# Patient Record
Sex: FEMALE | Race: WHITE | NOT HISPANIC OR LATINO | Employment: UNEMPLOYED | URBAN - METROPOLITAN AREA
[De-identification: names, ages, dates, MRNs, and addresses within clinical notes are randomized per-mention and may not be internally consistent; named-entity substitution may affect disease eponyms.]

---
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
## 2022-02-11 VITALS
BODY MASS INDEX: 28.5 KG/M2 | HEART RATE: 62 BPM | OXYGEN SATURATION: 99 % | SYSTOLIC BLOOD PRESSURE: 126 MMHG | BODY MASS INDEX: 28.35 KG/M2 | WEIGHT: 181.6 LBS | TEMPERATURE: 99.3 F | DIASTOLIC BLOOD PRESSURE: 86 MMHG | HEIGHT: 67 IN | TEMPERATURE: 97.9 F | WEIGHT: 180.6 LBS | DIASTOLIC BLOOD PRESSURE: 72 MMHG | SYSTOLIC BLOOD PRESSURE: 112 MMHG | HEART RATE: 100 BPM | HEIGHT: 67 IN

## 2022-02-12 VITALS
BODY MASS INDEX: 28.63 KG/M2 | HEART RATE: 81 BPM | WEIGHT: 182.4 LBS | SYSTOLIC BLOOD PRESSURE: 130 MMHG | HEART RATE: 75 BPM | HEIGHT: 67 IN | DIASTOLIC BLOOD PRESSURE: 80 MMHG | DIASTOLIC BLOOD PRESSURE: 81 MMHG | HEIGHT: 67 IN | BODY MASS INDEX: 28.25 KG/M2 | TEMPERATURE: 97.8 F | WEIGHT: 180 LBS | TEMPERATURE: 97.7 F | SYSTOLIC BLOOD PRESSURE: 138 MMHG

## 2022-02-15 NOTE — PROGRESS NOTES
Patient:   EDELMIRA HARRISON            MRN: [id number]            FIN: [id number]               Age:   16 years     Sex:  Female     :  2000   Associated Diagnoses:   None   Author:   Michael Loaiza MD      Visit Information      Date of Service: 2017 09:17 am  Performing Location: [facility]  Encounter#: [id number]      Primary Care Provider (PCP):  Not recorded.      Referring Provider:  No referring provider recorded for selected visit.      Chief Complaint   3/25/2017 9:32 AM CDT    c/o sore throat, cough x 3 days; friends have had bronchitis        History of Present Illness   see chief complaint as noted above and confirmed with the patient             The patient presents with cough.  The cough is described as barking and productive.  The severity of the cough is mild.  The context of the cough: occurred in association with illness.  Associated symptoms consist of chills, fever, nasal congestion, sore throat, denies chest pain, denies rash and denies shortness of breath.        Review of Systems   Constitutional:  Negative except as documented in history of present illness.    Ear/Nose/Mouth/Throat:  Negative except as documented in history of present illness.    Respiratory:  Negative except as documented in history of present illness.    Gastrointestinal:  Negative except as documented in history of present illness.    Genitourinary:  Negative except as documented in history of present illness.    Integumentary:  Negative except as documented in history of present illness.       Health Status   Allergies:    Allergic Reactions (Selected)  Severity Not Documented  Amoxicillin (Hives)   Medications:  (Selected)   Prescriptions  Prescribed  albuterol 90 mcg/inh inhalation aerosol: 2 puff(s), INH, QID, # 51 g, 0 Refill(s), Type: Maintenance, Pharmacy: [id number], 2 puff(s) inh qid  Documented Medications  Documented  Brintellix (vortioxetine) 10 mg oral  tablet: 1 tab(s) ( 10 mg ), po, qpm, 0 Refill(s), Type: Maintenance  Chantix: po, bid, 0 Refill(s), Type: Maintenance  Mirena 52 mg intrauterine device: 1 EA ( 52 mg ), intrauteral, once, 0 Refill(s), Type: Maintenance  busPIRone 5 mg oral tablet: 1 tab(s) ( 5 mg ), po, bid, 0 Refill(s), Type: Maintenance   Problem list:    All Problems  Tobacco user / [id number] / Probable      Histories   Past Medical History:    No active or resolved past medical history items have been selected or recorded.   Family History:    No family history items have been selected or recorded.   Procedure history:    No active procedure history items have been selected or recorded.      Physical Examination   Vital Signs   3/25/2017 9:32 AM CDT Temperature Tympanic 99.3 DegF    Peripheral Pulse Rate 100 bpm    Pulse Site Radial artery    HR Method Electronic    Systolic Blood Pressure 126 mmHg    Diastolic Blood Pressure 86 mmHg    Mean Arterial Pressure 99 mmHg    BP Site Right arm    BP Method Manual    Oxygen Saturation 99 %      Measurements from flowsheet : Measurements   3/25/2017 9:32 AM CDT Height Measured - Standard 67 in    Weight Measured - Standard 180.6 lb    BSA 1.97 m2    Body Mass Index 28.28 kg/m2    Body Mass Index Percentile 91.58      General:  Alert and oriented, No acute distress.    Neck:  No lymphadenopathy.    Respiratory:  Lungs are clear to auscultation, Respirations are non-labored.    Integumentary:  Warm.    Psychiatric:  Cooperative, Appropriate mood & affect.       Impression and Plan   Course:  likely influenza  discussed viral illnesses and what to expect and when to return  discussed symptom control and OTC meds  discussed handwashing and protection from spreading.

## 2022-02-15 NOTE — PROGRESS NOTES
Chief Complaint  Pt saw Allison Holliday on 2/7/17 and was advised to see Dr. Alcala for abnormal thyroid result.  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 ?Her pharyngitis is resolving. ?And she is feeling much better. ?She is always had irregular periods. ?She?now has Mirena. ?He has had about a 15 pound weight gain. ?No tremor, palpitations, muscle weakness, increased stool frequency.  Review of Systems  Fevers, chills, headache, myalgia chest pain, dyspnea, diarrhea, constipation.  Problem List/Past Medical History  Ongoing  Tobacco user  Resolved  No resolved problems  Procedure/Surgical History  Home Medications  Brintellix (vortioxetine) 10 mg oral tablet, 10 mg, 1 tab(s), po, qpm  busPIRone 5 mg oral tablet, 5 mg, 1 tab(s), po, bid  Chantix, po, bid  Mirena 52 mg intrauterine device, 52 mg, 1 EA, intrauteral, once  Allergies  amoxicillin?(hives)  Social History  Alcohol  Current  Exercise and Physical Activity  Exercise frequency: Daily.  Nutrition and Health  Type of diet: Regular.  Other  [facility] clinics  Sexual  Sexually active: Yes. Sexual orientation: Transgender. Uses condoms: Yes. Contraceptive Use Details: Birth control pill.  Substance Abuse  Current, Marijuana  Tobacco  Current every day smoker  Family History  Immunizations  Physical Exam  Vitals & Measurements  T:?97.7?(Tympanic)?  HR:?81?(Peripheral)?  BP:?138/81?  HT:?67?in?  WT:?182.4?lb?  BMI:?28.56?  Patient is a healthy-appearing young woman in no distress. ?Alert and oriented. ?HEENT exam neck reveals a generous thyroid which seems symmetric. ?No adenopathy or tenderness. ?Chest is clear. ?Cardiac exam is regular. ?And without murmur. ?No edema. ?Exam cranial nerves are normal.  Lab Results  On February 7 TSH slightly low at 0.41 free T4 upper limits of normal at 1.3.  Diagnostic Results  Assessment/Plan  Goiter  Patient with mildly  abnormal thyroid tests and no clear-cut symptoms. ?Certainly does not seem hyperthyroid. ?Will check an ultrasound and if negative merely repeat the TSH in 3 months or if symptoms develop.  Ordered:  Return to Clinic (Request)  US Thyroid (Request)

## 2022-02-15 NOTE — PROGRESS NOTES
Patient:   EDELMIRA HARRISON            MRN: [id number]            FIN: [id number]               Age:   16 years     Sex:  Female     :  2000   Associated Diagnoses:   Sore throat   Author:   Michael Loaiza MD      Chief Complaint   2017 9:57 AM CDT    c/o sore thoat, swollen glands, inflammd tonsils x 3 days        History of Present Illness             The patient presents with a sore throat.  The sore throat is described as burning.  The severity of the sore throat is mild.  The sore throat has lasted for 4 day(s).  The context of the sore throat: occurred in association with illness.  Relieving factors consist of analgesics.  Associated symptoms consist of denies difficulty swallowing, denies fever and denies voice change.        Review of Systems   Constitutional:  Negative except as documented in history of present illness.    Ear/Nose/Mouth/Throat:  Nasal congestion.    Respiratory:  No shortness of breath.    Gastrointestinal:  No nausea, No vomiting.    Immunologic:  No recurrent fevers.    Integumentary:  No rash, No skin lesion.       Health Status   Allergies:    Allergic Reactions (Selected)  Severity Not Documented  Amoxicillin (Hives)   Medications:  (Selected)   Prescriptions  Prescribed  albuterol 90 mcg/inh inhalation aerosol: 2 puff(s), INH, QID, # 51 g, 0 Refill(s), Type: Maintenance, Pharmacy: [id number], 2 puff(s) inh qid  Documented Medications  Documented  Brintellix (vortioxetine) 10 mg oral tablet: 1 tab(s) ( 10 mg ), po, qpm, 0 Refill(s), Type: Maintenance  Chantix: po, bid, 0 Refill(s), Type: Maintenance  Mirena 52 mg intrauterine device: 1 EA ( 52 mg ), intrauteral, once, 0 Refill(s), Type: Maintenance  busPIRone 5 mg oral tablet: 1 tab(s) ( 5 mg ), po, bid, 0 Refill(s), Type: Maintenance   Problem list:    All Problems  Tobacco user / [id number] / Probable      Histories   Past Medical History:    No active or resolved past medical history items have  been selected or recorded.   Family History:    No family history items have been selected or recorded.   Procedure history:    No active procedure history items have been selected or recorded.   Social History:        Alcohol Assessment            Current                     Comments:                      12/06/2016 - Kaye Ames MA                     1 time per week with 4 each time      Tobacco Assessment            Current every day smoker                     Comments:                      12/06/2016 - Kaye Ames MA                     smokes 3 cigs per day spent 1 year doing this      Substance Abuse Assessment            Current, Marijuana      Nutrition and Health Assessment            Type of diet: Regular.      Exercise and Physical Activity Assessment            Exercise frequency: Daily.                     Comments:                      12/06/2016 - Kaye Ames MA                     3-4 miles each day      Sexual Assessment            Sexually active: Yes.  Sexual orientation: Transgender.  Uses condoms: Yes.  Contraceptive Use Details: Birth               control pill.                     Comments:                      12/06/2016 Kaye Morataya MA                     male and female partners      Other Assessment            [facility]        Physical Examination   Vital Signs   4/29/2017 9:57 AM CDT Temperature Tympanic 97.9 DegF    Peripheral Pulse Rate 62 bpm    Systolic Blood Pressure 112 mmHg    Diastolic Blood Pressure 72 mmHg    Mean Arterial Pressure 85 mmHg      Measurements from flowsheet : Measurements   4/29/2017 9:57 AM CDT Height Measured - Standard 67 in    Weight Measured - Standard 181.6 lb    BSA 1.97 m2    Body Mass Index 28.44 kg/m2    Body Mass Index Percentile 91.80      General:  Alert and oriented, No acute distress.    Neck:  Supple, Non-tender.    Respiratory:  Lungs are clear to auscultation, Respirations are non-labored.     Cardiovascular:  Normal rate, Regular rhythm.    Gastrointestinal:  Non-tender.    Psychiatric:  Cooperative, Appropriate mood & affect.       Review / Management   Results review:  Lab results: 2/7/2017 2:21 PM CST     Rapid Strep A             Negative  .       Impression and Plan   Diagnosis     Sore throat (EFG40-TS J02.9).     Course:  gargling may help  ensure plenty of fluids  full culture will be done  tylenol for pain   recheck if worse.

## 2022-02-15 NOTE — PROGRESS NOTES
Patient:   EDELMIRA HARRISON            MRN: [id number]            FIN: [id number]               Age:   16 years     Sex:  Female     :  2000   Associated Diagnoses:   Goiter; Sore throat   Author:   Allison Bass      Chief Complaint   2017 2:06 PM CST     Patient is here with c/o sore throat, fatigue, nausea, fever/chills last 3 ago.      History of Present Illness   Chief complaint reviewed and confirmed with patient. Pt. complaining of severe sore throat, enlarged lymph nodes and fever. She has a history of strep pharyngitis. Allergic to amoxicillin. Denies cough, SOB, Chest pain, ear pain, or sinus pain or pressure.       Review of Systems   Constitutional:  Negative except as documented in history of present illness.    Eye:  Negative.    Ear/Nose/Mouth/Throat:  Negative except as documented in history of present illness.    Respiratory:  Negative.    Cardiovascular:  Negative.       Health Status   Allergies:    Allergic Reactions (Selected)  Severity Not Documented  Amoxicillin (Hives)   Medications:  (Selected)   Documented Medications  Documented  Brintellix (vortioxetine) 10 mg oral tablet: 1 tab(s) ( 10 mg ), po, qpm, 0 Refill(s), Type: Maintenance  Chantix: po, bid, 0 Refill(s), Type: Maintenance  Mirena 52 mg intrauterine device: 1 EA ( 52 mg ), intrauteral, once, 0 Refill(s), Type: Maintenance  busPIRone 5 mg oral tablet: 1 tab(s) ( 5 mg ), po, bid, 0 Refill(s), Type: Maintenance   Problem list:    All Problems  Tobacco user / SNOMED CT [id number] / Probable      Histories   Past Medical History:    No active or resolved past medical history items have been selected or recorded.   Family History:    No family history items have been selected or recorded.   Procedure history:    No active procedure history items have been selected or recorded.   Social History:        Alcohol Assessment            Current                     Comments:                      2016 - Kwaku TUBBS,  Kaye GONZALEZ                     1 time per week with 4 each time      Tobacco Assessment            Current every day smoker                     Comments:                      12/06/2016 - Kaye Ames MA                     smokes 3 cigs per day spent 1 year doing this      Substance Abuse Assessment            Current, Marijuana      Nutrition and Health Assessment            Type of diet: Regular.      Exercise and Physical Activity Assessment            Exercise frequency: Daily.                     Comments:                      12/06/2016 Jo-Ann Kaye Ames MA                     3-4 miles each day      Sexual Assessment            Sexually active: Yes.  Sexual orientation: Transgender.  Uses condoms: Yes.  Contraceptive Use Details: Birth               control pill.                     Comments:                      12/06/2016 - Kwaku Kaye TUBBS                     male and female partners      Other Assessment            [facility]        Physical Examination   Vital Signs   2/7/2017 2:06 PM CST Temperature Tympanic 97.8 DegF  LOW    Peripheral Pulse Rate 75 bpm    HR Method Electronic    Systolic Blood Pressure 130 mmHg    Diastolic Blood Pressure 80 mmHg    Mean Arterial Pressure 97 mmHg    BP Site Right arm    BP Method Electronic      Measurements from flowsheet : Measurements   2/7/2017 2:06 PM CST Height Measured - Standard 67 in    Weight Measured - Standard 180 lb    BSA 1.96 m2    Body Mass Index 28.19 kg/m2    Body Mass Index Percentile 91.58    Ht/Wt Measurement Refused by Patient? No      General:  Alert and oriented, Mild distress.    Eye:  Normal conjunctiva.    HENT:  Normocephalic.         Ear: Both ears.         Nose: Both nostrils, Within normal limits.         Mouth: Within normal limits.         Throat: Uvula ( Within normal limits ), Tonsils ( Bilateral tonsils, Enlarged, Erythematous ), Pharynx ( Edematous, Erythematous ).    Neck:       Thyroid: Lobes (  Bilateral lobes, Enlarged, Palpable ).    Respiratory:  Lungs are clear to auscultation.    Cardiovascular:  Normal rate, Regular rhythm.       Impression and Plan   Diagnosis     Goiter (XEY76-HX E04.9).     Sore throat (CZD29-IP J02.9).     Course:  Worsening.    Plan:  1) Check TSH due to goiter  2) Start Z-parisa as patient is penicillin allergic  3) Start medication due to severe sore throat, enlarged lymph nodes and history of fever  4) Salt water gargle  5) Motrin and ibuprofen for pain  6) RTC if no improvement or prn..    Patient Instructions:       Counseled: Patient, Regarding diagnosis, Regarding medications, Verbalized understanding.

## 2024-07-08 ENCOUNTER — OFFICE VISIT (OUTPATIENT)
Dept: URGENT CARE | Facility: URGENT CARE | Age: 24
End: 2024-07-08
Payer: COMMERCIAL

## 2024-07-08 ENCOUNTER — ANCILLARY PROCEDURE (OUTPATIENT)
Dept: GENERAL RADIOLOGY | Facility: CLINIC | Age: 24
End: 2024-07-08
Attending: PHYSICIAN ASSISTANT
Payer: COMMERCIAL

## 2024-07-08 VITALS
HEART RATE: 88 BPM | SYSTOLIC BLOOD PRESSURE: 154 MMHG | BODY MASS INDEX: 32.8 KG/M2 | OXYGEN SATURATION: 98 % | DIASTOLIC BLOOD PRESSURE: 90 MMHG | RESPIRATION RATE: 16 BRPM | WEIGHT: 209.4 LBS | TEMPERATURE: 98.9 F

## 2024-07-08 DIAGNOSIS — M79.641 PAIN OF RIGHT HAND: Primary | ICD-10-CM

## 2024-07-08 DIAGNOSIS — M79.641 PAIN OF RIGHT HAND: ICD-10-CM

## 2024-07-08 PROCEDURE — 73130 X-RAY EXAM OF HAND: CPT | Mod: TC | Performed by: RADIOLOGY

## 2024-07-08 PROCEDURE — 99203 OFFICE O/P NEW LOW 30 MIN: CPT | Performed by: PHYSICIAN ASSISTANT

## 2024-07-08 ASSESSMENT — ENCOUNTER SYMPTOMS
CHILLS: 0
FEVER: 0
SORE THROAT: 0
WEAKNESS: 0
ARTHRALGIAS: 1
COUGH: 0
HEADACHES: 0
RESPIRATORY NEGATIVE: 1
RHINORRHEA: 0
NAUSEA: 0
LIGHT-HEADEDNESS: 0
MYALGIAS: 0
NECK STIFFNESS: 0
DIZZINESS: 0
ENDOCRINE NEGATIVE: 1
DIARRHEA: 0
CARDIOVASCULAR NEGATIVE: 1
SHORTNESS OF BREATH: 0
BACK PAIN: 0
JOINT SWELLING: 0
VOMITING: 0
NECK PAIN: 0
WOUND: 0
EYES NEGATIVE: 1
ALLERGIC/IMMUNOLOGIC NEGATIVE: 1
PALPITATIONS: 0

## 2024-07-08 NOTE — PROGRESS NOTES
Chief Complaint:     Chief Complaint   Patient presents with    Hand Injury     Hand pain for about 10 days, unsure of injury. Mild swelling.       ASSESSMENT     1. Pain of right hand         PLAN    XR of the R hand was negative for any acute fracture per my read.  RICE discussed  Recommended rest and avoidance of activities which cause pain or swelling.  Pain relief: Acetaminophen and or Ibuprofen with food.  Patient verbalized understanding, and agrees with this plan.    HPI: Lea Young is an 23 year old female who presents today for evaluation of R hand pain.  Onset of the pain was 10 days ago.  She does not recall any acute injury.  She has pain with movement of the pinky finger.    Patient denies any numbness, tingling, or dysfunction of the R hand.    Patient is new to [facility].    ROS:      Review of Systems   Constitutional:  Negative for chills and fever.   HENT:  Negative for congestion, ear pain, rhinorrhea and sore throat.    Eyes: Negative.    Respiratory: Negative.  Negative for cough and shortness of breath.    Cardiovascular: Negative.  Negative for chest pain and palpitations.   Gastrointestinal:  Negative for diarrhea, nausea and vomiting.   Endocrine: Negative.    Genitourinary: Negative.    Musculoskeletal:  Positive for arthralgias. Negative for back pain, joint swelling, myalgias, neck pain and neck stiffness.   Skin: Negative.  Negative for rash and wound.   Allergic/Immunologic: Negative.  Negative for immunocompromised state.   Neurological:  Negative for dizziness, weakness, light-headedness and headaches.        Problem history  There is no problem list on file for this patient.       Allergies  Allergies   Allergen Reactions    Amoxicillin Hives and Rash        Smoking History  History   Smoking Status    Every Day    Types: Cigarettes   Smokeless Tobacco    Never        Current Meds  No current outpatient medications on file.        Vital signs reviewed by Peyman  KASSANDRA Pro PA-C  BP (!) 154/90 (BP Location: Left arm, Cuff Size: Adult Regular)   Pulse 88   Temp 98.9  F (37.2  C) (Tympanic)   Resp 16   Wt 95 kg (209 lb 6.4 oz)   LMP 07/08/2024 (Exact Date)   SpO2 98%   BMI 32.80 kg/m      Physical Exam     Physical Exam  Vitals and nursing note reviewed.   Constitutional:       General: She is not in acute distress.     Appearance: She is well-developed. She is not ill-appearing, toxic-appearing or diaphoretic.   HENT:      Head: Normocephalic and atraumatic.      Right Ear: Tympanic membrane and external ear normal. No drainage, swelling or tenderness. Tympanic membrane is not perforated, erythematous, retracted or bulging.      Left Ear: Tympanic membrane and external ear normal. No drainage, swelling or tenderness. Tympanic membrane is not perforated, erythematous, retracted or bulging.      Nose: No mucosal edema, congestion or rhinorrhea.      Right Sinus: No maxillary sinus tenderness or frontal sinus tenderness.      Left Sinus: No maxillary sinus tenderness or frontal sinus tenderness.      Mouth/Throat:      Pharynx: No pharyngeal swelling, oropharyngeal exudate, posterior oropharyngeal erythema or uvula swelling.      Tonsils: No tonsillar abscesses.   Eyes:      Pupils: Pupils are equal, round, and reactive to light.   Neck:      Trachea: Trachea normal.   Cardiovascular:      Rate and Rhythm: Normal rate and regular rhythm.      Heart sounds: Normal heart sounds, S1 normal and S2 normal. No murmur heard.     No friction rub. No gallop.   Pulmonary:      Effort: Pulmonary effort is normal. No respiratory distress.      Breath sounds: Normal breath sounds. No decreased breath sounds, wheezing, rhonchi or rales.   Abdominal:      General: Bowel sounds are normal. There is no distension.      Palpations: Abdomen is soft. Abdomen is not rigid. There is no mass.      Tenderness: There is no abdominal tenderness. There is no guarding or rebound.   Musculoskeletal:       Right hand: Tenderness present. No swelling, deformity or bony tenderness. Normal range of motion. Normal strength. Normal sensation. There is no disruption of two-point discrimination. Normal capillary refill. Normal pulse.        Hands:       Cervical back: Full passive range of motion without pain, normal range of motion and neck supple.   Lymphadenopathy:      Cervical: No cervical adenopathy.   Skin:     General: Skin is warm and dry.   Neurological:      Mental Status: She is alert and oriented to person, place, and time.      Cranial Nerves: No cranial nerve deficit.      Deep Tendon Reflexes: Reflexes are normal and symmetric.   Psychiatric:         Behavior: Behavior normal. Behavior is cooperative.         Thought Content: Thought content normal.         Judgment: Judgment normal.             Peyman Pro PA-C  7/8/2024, 5:09 PM